# Patient Record
Sex: FEMALE | Race: AMERICAN INDIAN OR ALASKA NATIVE
[De-identification: names, ages, dates, MRNs, and addresses within clinical notes are randomized per-mention and may not be internally consistent; named-entity substitution may affect disease eponyms.]

---

## 2017-10-18 ENCOUNTER — HOSPITAL ENCOUNTER (EMERGENCY)
Dept: HOSPITAL 5 - ED | Age: 32
Discharge: LEFT BEFORE DISCHARGE | End: 2017-10-18
Payer: SELF-PAY

## 2017-10-18 DIAGNOSIS — S69.82XA: Primary | ICD-10-CM

## 2017-10-18 DIAGNOSIS — Z53.21: ICD-10-CM

## 2019-06-16 ENCOUNTER — HOSPITAL ENCOUNTER (EMERGENCY)
Dept: HOSPITAL 5 - ED | Age: 34
Discharge: HOME | End: 2019-06-16
Payer: COMMERCIAL

## 2019-06-16 VITALS — SYSTOLIC BLOOD PRESSURE: 119 MMHG | DIASTOLIC BLOOD PRESSURE: 84 MMHG

## 2019-06-16 DIAGNOSIS — N76.0: Primary | ICD-10-CM

## 2019-06-16 DIAGNOSIS — Z20.2: ICD-10-CM

## 2019-06-16 DIAGNOSIS — F17.200: ICD-10-CM

## 2019-06-16 LAB
BILIRUB UR QL STRIP: (no result)
BLOOD UR QL VISUAL: (no result)
MUCOUS THREADS #/AREA URNS HPF: (no result) /HPF
PH UR STRIP: 5 [PH] (ref 5–7)
PROT UR STRIP-MCNC: (no result) MG/DL
RBC #/AREA URNS HPF: 4 /HPF (ref 0–6)
UROBILINOGEN UR-MCNC: < 2 MG/DL (ref ?–2)
WBC #/AREA URNS HPF: 19 /HPF (ref 0–6)

## 2019-06-16 PROCEDURE — 87210 SMEAR WET MOUNT SALINE/INK: CPT

## 2019-06-16 PROCEDURE — 99284 EMERGENCY DEPT VISIT MOD MDM: CPT

## 2019-06-16 PROCEDURE — 87076 CULTURE ANAEROBE IDENT EACH: CPT

## 2019-06-16 PROCEDURE — 81001 URINALYSIS AUTO W/SCOPE: CPT

## 2019-06-16 PROCEDURE — 96372 THER/PROPH/DIAG INJ SC/IM: CPT

## 2019-06-16 PROCEDURE — 81025 URINE PREGNANCY TEST: CPT

## 2019-06-16 PROCEDURE — 87186 SC STD MICRODIL/AGAR DIL: CPT

## 2019-06-16 PROCEDURE — 87591 N.GONORRHOEAE DNA AMP PROB: CPT

## 2019-06-16 PROCEDURE — 87086 URINE CULTURE/COLONY COUNT: CPT

## 2019-06-16 RX ADMIN — IBUPROFEN ONE MG: 600 TABLET, FILM COATED ORAL at 21:51

## 2019-06-16 RX ADMIN — IBUPROFEN ONE MG: 600 TABLET, FILM COATED ORAL at 21:30

## 2019-06-16 NOTE — EVENT NOTE
ED Screening Note


Date of service: 06/16/19


Time: 18:33


ED Screening Note: 





35 y/o femal comes in for pelvic and back pain. Admits to urinary frequency.  No

dysuria.  


This initial assessment/diagnostic orders/clinical plan/treatment(s) is/are 

subject to change based on patients health status, clinical progression and re-

assessment by fellow clinical providers in the ED. Further treatment and workup 

at subsequent clinical providers discretion. Patient/guardian urged not to elope

from the ED as their condition may be serious if not clinically assessed and 

managed. 





Initial orders include:

## 2019-06-16 NOTE — EMERGENCY DEPARTMENT REPORT
ED Female  HPI





- General


Chief complaint: Abdominal Pain


Stated complaint: LOWER ABD/BACK PAIN


Source: patient


Mode of arrival: Ambulatory


Limitations: No Limitations





- History of Present Illness


Initial comments: 





This is a 34-year-old -American female who presents to the emergency room

with lower abdominal cramping and back pain for 2 days.  Patient also reports 

vaginal discharge and urinary frequency.  She reports suprapubic pain as a 

cramping intensity that is worse with urination.  Last menstrual period was 

2019,  A0.  She denies nausea, vomiting, diarrhea, dysuria, and 

hematuria.


MD Complaint: vaginal discharge, pelvic pain, possible STD


Onset/Timin


-: days(s)


Location: suprapubic


Radiation: non-radiating


Severity: moderate


Severity scale (0 -10): 7


Quality: cramping


Consistency: intermittent


Improves with: none


Worsens with: urination


Are you Pregnant Now?: No


Last Menstrual Period: 19


EDC: 20


Associated Symptoms: vaginal discharge.  denies: vaginal bleeding, 

nausea/vomiting, fever/chills, headaches, loss of appetite, dysuria, hematuria, 

rash, seizure, shortness of breath, syncope, weakness





- Related Data


Sexually active: Yes


: 2


Para: 2


A: 0


                                  Previous Rx's











 Medication  Instructions  Recorded  Last Taken  Type


 


metroNIDAZOLE [Flagyl TAB] 500 mg PO Q12HR #14 tab 19 Unknown Rx











                                    Allergies











Allergy/AdvReac Type Severity Reaction Status Date / Time


 


No Known Allergies Allergy   Verified 19 17:48














ED Review of Systems


ROS: 


Stated complaint: LOWER ABD/BACK PAIN


Other details as noted in HPI





Constitutional: denies: chills, fever


Respiratory: denies: cough, shortness of breath, wheezing


Cardiovascular: denies: chest pain, palpitations


Gastrointestinal: abdominal pain.  denies: nausea, diarrhea


Genitourinary: frequency, discharge.  denies: urgency, dysuria, hematuria, 

abnormal menses


Musculoskeletal: back pain.  denies: joint swelling, arthralgia


Skin: denies: rash, lesions


Neurological: denies: headache, weakness, paresthesias


Psychiatric: denies: anxiety, depression





ED Past Medical Hx





- Past Medical History


Hx Congestive Heart Failure: No


Hx Diabetes: No


Hx Asthma: No


Hx COPD: No





- Social History


Smoking Status: Current Every Day Smoker


Substance Use Type: None





- Medications


Home Medications: 


                                Home Medications











 Medication  Instructions  Recorded  Confirmed  Last Taken  Type


 


metroNIDAZOLE [Flagyl TAB] 500 mg PO Q12HR #14 tab 19  Unknown Rx














ED Physical Exam





- General


Limitations: No Limitations


General appearance: alert, in no apparent distress, obese





- Respiratory


Respiratory exam: Present: normal lung sounds bilaterally.  Absent: respiratory 

distress





- Cardiovascular


Cardiovascular Exam: Present: regular rate, normal rhythm.  Absent: systolic 

murmur, diastolic murmur, rubs, gallop





- GI/Abdominal


GI/Abdominal exam: Present: soft, normal bowel sounds.  Absent: distended, 

tenderness, guarding, rebound, rigid, organomegaly, mass





- 


External exam: Present: normal external exam.  Absent: erythema, swelling, 

lesions, lacerations, ecchymosis, bleeding


Speculum exam: Present: erythema, vaginal discharge (yellowish malodorous 

frothy).  Absent: vaginal bleeding, foreign body


Bi-manual exam: Present: normal bi-manual exam.  Absent: cervical motion 

tendernes, adnexal tenderness, adnexal mass, uterine enlargement, uterine 

tenderness





- Back Exam


Back exam: Absent: CVA tenderness (R), CVA tenderness (L)





- Neurological Exam


Neurological exam: Present: alert, oriented X3, normal gait





- Psychiatric


Psychiatric exam: Present: normal affect, normal mood





- Skin


Skin exam: Present: warm, dry, intact, normal color.  Absent: rash





ED Course


                                   Vital Signs











  19





  18:11 22:52 22:56


 


Temperature 98 F 98.8 F 


 


Pulse Rate 110 H 87 


 


Respiratory 20 18 18





Rate   


 


Blood Pressure 135/91  


 


Blood Pressure  119/84 





[Right]   


 


O2 Sat by Pulse 99 97 98





Oximetry   














ED Medical Decision Making





- Lab Data








                                   Lab Results











  19 Range/Units





  18:30 


 


Urine Color  Yellow  (Yellow)  


 


Urine Turbidity  Cloudy  (Clear)  


 


Urine pH  5.0  (5.0-7.0)  


 


Ur Specific Gravity  1.014  (1.003-1.030)  


 


Urine Protein  <15 mg/dl  (Negative)  mg/dL


 


Urine Glucose (UA)  Neg  (Negative)  mg/dL


 


Urine Ketones  Neg  (Negative)  mg/dL


 


Urine Blood  Neg  (Negative)  


 


Urine Nitrite  Neg  (Negative)  


 


Ur Reducing Substances  Not Reportable  


 


Urine Bilirubin  Neg  (Negative)  


 


Urine Ictotest  Not Reportable  


 


Urine Urobilinogen  < 2.0  (<2.0)  mg/dL


 


Ur Leukocyte Esterase  Mod  (Negative)  


 


Urine WBC (Auto)  19.0 H  (0.0-6.0)  /HPF


 


Urine RBC (Auto)  4.0  (0.0-6.0)  /HPF


 


U Epithel Cells (Auto)  10.0  (0-13.0)  /HPF


 


Urine Mucus  Few  /HPF


 


Urine Yeast (Budding)  Few  /HPF


 


Urine HCG, Qual  Negative  (Negative)  














- Medical Decision Making





Patient was examined by me.  Vitals are stable and in no acute distress.  A 

urinalysis, urine hCG, wet prep and gonorrhea and chlamydia was obtained via 

pelvic exam.  Empirically treated with Rocephin 250 mg IM and azithromycin 1 g 

by mouth.  Start metronidazole for bacterial vaginitis.  Discharged home in 

stable condition.  Discussed prevention options. F/U with PCP or Health 

Department.


Critical care attestation.: 


If time is entered above; I have spent that time in minutes in the direct care 

of this critically ill patient, excluding procedure time.








ED Disposition


Clinical Impression: 


 Vaginal discharge, STD exposure, Bacterial vaginitis





Disposition:  TO HOME OR SELFCARE


Is pt being admited?: No


Does the pt Need Aspirin: No


Condition: Stable


Instructions:  Bacterial Vaginosis (ED), Abdominal Pain (ED)


Additional Instructions: 


Avoid drinking alcohol while taking antibiotics and for 24 hours after 

completion.


Continue safe sexual intercourse.


Follow up with Primary Care Provider or health department.


Prescriptions: 


metroNIDAZOLE [Flagyl TAB] 500 mg PO Q12HR #14 tab


Referrals: 


LANETTE TALAMANTES MD [Primary Care Provider] - 3-5 Days


[facility] [Provider Group] - 3-5 Days


[facility] [Provider Group] - 3-5 Days


Forms:  STI Treatment and Prevention


Time of Disposition: 21:56

## 2021-07-09 ENCOUNTER — HOSPITAL ENCOUNTER (EMERGENCY)
Dept: HOSPITAL 5 - ED | Age: 36
Discharge: HOME | End: 2021-07-09
Payer: SELF-PAY

## 2021-07-09 VITALS — SYSTOLIC BLOOD PRESSURE: 128 MMHG | DIASTOLIC BLOOD PRESSURE: 76 MMHG

## 2021-07-09 DIAGNOSIS — Z79.899: ICD-10-CM

## 2021-07-09 DIAGNOSIS — J45.909: Primary | ICD-10-CM

## 2021-07-09 DIAGNOSIS — F17.200: ICD-10-CM

## 2021-07-09 PROCEDURE — 94640 AIRWAY INHALATION TREATMENT: CPT

## 2021-07-09 PROCEDURE — 93005 ELECTROCARDIOGRAM TRACING: CPT

## 2021-07-09 PROCEDURE — 96372 THER/PROPH/DIAG INJ SC/IM: CPT

## 2021-07-09 PROCEDURE — 99283 EMERGENCY DEPT VISIT LOW MDM: CPT

## 2021-07-09 PROCEDURE — 71046 X-RAY EXAM CHEST 2 VIEWS: CPT

## 2021-07-09 NOTE — EMERGENCY DEPARTMENT REPORT
ED Asthma HPI





- General


Chief Complaint: Dyspnea/Respdistress


Stated Complaint: CHEST PAIN  BREATHING ISSUE


Time Seen by Provider: 07/09/21 14:37


Source: patient


Mode of arrival: Ambulatory


Limitations: No Limitations





- History of Present Illness


Initial Comments: 





36-year-old -American female presents to the emergency room stating that 

she has had shortness of breath and chest tightness.  Patient states that it 

started last night.  She reports that she has not had an asthma attack since 

December.  States that she has chest pain when she takes a deep breath.  She 

says heat triggers her asthma.  Last menstrual period was sixth 1221.  Denies 

any fever chills mild cough worse at night chest pain with cough.  Past medical 

history of asthma.


MD Complaint: "asthma attack", wheezing


-: Last night





- Related Data


                                  Previous Rx's











 Medication  Instructions  Recorded  Last Taken  Type


 


metroNIDAZOLE [Flagyl TAB] 500 mg PO Q12HR #14 tab 06/16/19 Unknown Rx


 


Albuterol Sulfate [Proventil Hfa] 6.7 gm IH TID PRN 1 Days #1 07/09/21 Unknown 

Rx





 [email or web address]   


 


Prednisone [predniSONE 5 mg (6-Day 5 mg PO .TAPER #1 tab.ds.pk 07/09/21 Unknown 

Rx





Pack, 21 Tabs)]    











                                    Allergies











Allergy/AdvReac Type Severity Reaction Status Date / Time


 


No Known Allergies Allergy   Verified 06/16/19 17:48














ED Review of Systems


ROS: 


Stated complaint: CHEST PAIN  BREATHING ISSUE


Other details as noted in HPI





Comment: All other systems reviewed and negative





ED Past Medical Hx





- Past Medical History


Previous Medical History?: Yes


Hx Congestive Heart Failure: No


Hx Diabetes: No


Hx Asthma: Yes


Hx COPD: No





- Social History


Smoking Status: Current Every Day Smoker


Substance Use Type: None





- Medications


Home Medications: 


                                Home Medications











 Medication  Instructions  Recorded  Confirmed  Last Taken  Type


 


metroNIDAZOLE [Flagyl TAB] 500 mg PO Q12HR #14 tab 06/16/19  Unknown Rx


 


Albuterol Sulfate [Proventil Hfa] 6.7 gm IH TID PRN 1 Days #1 07/09/21  Unknown 

Rx





 [email or web address]    


 


Prednisone [predniSONE 5 mg (6-Day 5 mg PO .TAPER #1 tab.ds.pk 07/09/21  Unknown

 Rx





Pack, 21 Tabs)]     














ED Physical Exam





- General


Limitations: No Limitations


General appearance: alert, in no apparent distress





- Head


Head exam: Present: atraumatic, normocephalic





- Eye


Eye exam: Present: normal appearance





- ENT


ENT exam: Present: mucous membranes moist, normal external ear exam





- Neck


Neck exam: Present: normal inspection, full ROM





- Respiratory


Respiratory exam: Present: wheezes, rhonchi, accessory muscle use





- Cardiovascular


Cardiovascular Exam: Present: tachycardia





- GI/Abdominal


GI/Abdominal exam: Present: soft, normal bowel sounds





- Back Exam


Back exam: Present: normal inspection





- Neurological Exam


Neurological exam: Present: alert, oriented X3, normal gait





- Psychiatric


Psychiatric exam: Present: normal affect, normal mood





- Skin


Skin exam: Present: warm, dry, intact, normal color.  Absent: rash





ED Course





                                   Vital Signs











  07/09/21





  14:19


 


Temperature 98.9 F


 


Pulse Rate 124 H


 


Respiratory 22





Rate 


 


Blood Pressure 150/104


 


O2 Sat by Pulse 98





Oximetry 














- Reevaluation(s)


Reevaluation #1: 





07/09/21 1645


Patient's lungs sound much better.  Patient's breathing has improved not 

effortless.  No accessory muscles use patient is comfortably.





ED Medical Decision Making





- Radiology Data


Radiology results: report reviewed





36-year-old -American female presents to the emergency room stating that 

she has had shortness of breath and chest tightness.  Patient states that it 

started last night.  She reports that she has not had an asthma attack since 

December.  States that she has chest pain when she takes a deep breath.  She 

says heat triggers her asthma.  Last menstrual period was sixth 1221.  Denies 

any fever chills mild cough worse at night chest pain with cough.  Past medical 

history of asthma.





Patient was given albuterol Atrovent and dexamethasone.








Patient reports she felt much better.  Patient be discharged home on a 

prednisone pack albuterol inhaler and referral to [facility].





- Medical Decision Making





36-year-old -American female presents to the emergency room stating that 

she has had shortness of breath and chest tightness.  Patient states that it 

started last night.  She reports that she has not had an asthma attack since 

December.  States that she has chest pain when she takes a deep breath.  She 

says heat triggers her asthma.  Last menstrual period was sixth 1221.  Denies 

any fever chills mild cough worse at night chest pain with cough.  Past medical 

history of asthma.











Chest x-ray, albuterol 5 mg inhalation, Atrovent 0.5 mg inhalation.  

Dexamethasone 10 mg IM.








Patient reports that she feels much better after having treatment.  Discussed 

with patient discharged home 1 prednisone pack.  And a refill on her inhaler.  

Patient verbalized understanding


Critical care attestation.: 


If time is entered above; I have spent that time in minutes in the direct care 

of this critically ill patient, excluding procedure time.








ED Disposition


Clinical Impression: 


 Asthma





Disposition: DC-01 TO HOME OR SELFCARE


Is pt being admited?: No


Does the pt Need Aspirin: No


Condition: Stable


Instructions:  Asthma (ED), Asthma, Adult, Easy-to-Read


Additional Instructions: 


Please complete prednisone pack.  Use your inhaler as needed.  Follow-up with 

your primary care provider.


Prescriptions: 


Prednisone [predniSONE 5 mg (6-Day Pack, 21 Tabs)] 5 mg PO .TAPER #1 tab.ds.pk


Albuterol Sulfate [Proventil Hfa] 6.7 gm IH TID PRN 1 Days #1 [email or web address]


 PRN Reason: Wheezing


Referrals: 


[facility] [Provider Group] - 3-5 Days


Forms:  Work/School Release Form(ED)

## 2021-07-09 NOTE — XRAY REPORT
CHEST 2 VIEWS 



INDICATION / CLINICAL INFORMATION: Chest pain with cough and shortness of breath.



COMPARISON: 12/22/18.



FINDINGS:



SUPPORT DEVICES: None.

HEART / MEDIASTINUM: The heart size and pulmonary vasculature are normal. The aorta is normal in mark
kareem. 

LUNGS / PLEURA: No significant pulmonary or pleural abnormality. No pneumothorax. 



ADDITIONAL FINDINGS: There are bilateral nipple piercings.



IMPRESSION: No acute abnormality or significant change.



Signer Name: Denilson Willougbhy MD 

Signed: 7/9/2021 3:25 PM

Workstation Name: [id number]

## 2021-07-12 NOTE — ELECTROCARDIOGRAPH REPORT
[facility]

                                       

Test Date:    2021               Test Time:    14:29:45

Pat Name:     NELDA LYNNE          Department:   

Patient ID:   [id number]          Room:          

Gender:       F                        Technician:   ADRIAN

:          1985               Requested By: JOSE FRANCISCO OGLESBY

Order Number: [id number]              Reading MD:   Doe Evans

                                 Measurements

Intervals                              Axis          

Rate:         108                      P:            64

IL:           175                      QRS:          72

QRSD:         83                       T:            -35

QT:           322                                    

QTc:          432                                    

                           Interpretive Statements

Sinus tachycardia

Nonspecific T abnormalities, diffuse leads

No previous ECG available for comparison

Electronically Signed On 2021 17:44:13 EDT by Doe Evans